# Patient Record
(demographics unavailable — no encounter records)

---

## 2025-05-28 NOTE — ADDENDUM
[FreeTextEntry1] : She tolerated the procedure. She ambulated for 5 minutes in the hallway. She verbalized understanding of the post procedure instructions. She will restart Eliquis in the am.  She will follow up in 1 week.

## 2025-05-28 NOTE — PROCEDURE
[FreeTextEntry1] : Varithena ablation of left GSV remnant [FreeTextEntry2] : Symptomatic Varicose veins with venous insufficiency [FreeTextEntry3] : Patient was seen in the examination room and consent was obtained.  Patient was then taken to the procedure room where a timeout was called to verify the patient's identity and laterality procedure.  The patient's left greater saphenous vein remnant was then mapped.  Patient's leg was then prepped and draped in standard surgical fashion.  Patient was given injection of 1% plain lidocaine in the distal calf.  A micropuncture needle was used to access the greater saphenous vein.  A micropuncture wire was then inserted through the needle.  The needle was then removed.  Micropuncture dilator sheath was then inserted over the wire.  The patient's leg was then elevated for 3 minutes.  The dilator and wire were then removed.  10 cc of injectable saline was then inserted through the sheath.  A Varithena ablation was then performed in the standard fashion.  Compression was held on the perforators and the patient was asked to squeeze her calf muscles 20 times.  Three minutes was allowed to pass.  A repeat ultrasound was found which demonstrated an excellent result.  Sterile dressing was then applied.  The patient tolerated the procedure and was discharged in stable condition.

## 2025-06-04 NOTE — ASSESSMENT
[FreeTextEntry1] : 90yo female with symptomatic varicose veins in the lateral aspect of the left leg. The patient is s/p venous procedures of the bilateral extremities. She is now s/p a Varithena ablation of the left GSV. remnant. She is doing well post procedure. She underwent a venous duplex which demonstrated no DVT and an appropriately ablated GSV. I recommended that she continue to wear a compression stocking on a daily basis. She will follow up if she develops a problem.

## 2025-06-04 NOTE — HISTORY OF PRESENT ILLNESS
[FreeTextEntry1] : 90 yo female presents to the office with a chief complaint of pain and swelling of the left medial malleolus. She reports a history of venous insufficiency. She reports that she is s/p bilateral venous procedures. She reports that she is most recently s/p a closurefast for a nonhealing venous ulcer on the right. She reports pain and swelling associated with a cluster of the varicose veins to the left  ankle. She reports hardening of the veins, as well as worsening pain. She reports mild ankle edema. She denies a history of DVT or SVT. She reports significant pain despite wearing compression stockings.        [de-identified] : 92 yo female with a history of venous insufficiency. She is s/p bilateral venous procedures in the past. She developed pain and swelling of the left medial malleolus. She is now s/p a  Varithena ablation of the left GSV remnant. She reports that she is doing well post procedure. She reports that she is back on Eliquis. She denies fever or chills. She underwent a venous duplex and is here to discuss the results.

## 2025-06-04 NOTE — PHYSICAL EXAM
[Ankle Swelling On The Right] : mild [JVD] : no jugular venous distention  [Normal Breath Sounds] : Normal breath sounds [Ankle Swelling (On Exam)] : not present [Ankle Swelling On The Left] : of the left ankle [Varicose Veins Of Lower Extremities] : not present [Varicose Veins Of The Left Leg] : of the left leg [] : of the left leg [Alert] : alert [Oriented to Person] : oriented to person [Oriented to Place] : oriented to place [Oriented to Time] : oriented to time [de-identified] : Awake and alert [de-identified] : catheter insertion site without erythema, varicose veins decompressed [de-identified] :  No gross motor or sensory deficits. [de-identified] : Appropriate